# Patient Record
Sex: MALE | Race: WHITE | NOT HISPANIC OR LATINO | URBAN - NONMETROPOLITAN AREA
[De-identification: names, ages, dates, MRNs, and addresses within clinical notes are randomized per-mention and may not be internally consistent; named-entity substitution may affect disease eponyms.]

---

## 2023-05-26 ENCOUNTER — OFFICE (OUTPATIENT)
Dept: URBAN - NONMETROPOLITAN AREA CLINIC 1 | Facility: CLINIC | Age: 23
End: 2023-05-26

## 2023-05-26 VITALS
SYSTOLIC BLOOD PRESSURE: 143 MMHG | DIASTOLIC BLOOD PRESSURE: 88 MMHG | HEART RATE: 60 BPM | WEIGHT: 191 LBS | HEIGHT: 69 IN

## 2023-05-26 DIAGNOSIS — K21.9 GASTRO-ESOPHAGEAL REFLUX DISEASE WITHOUT ESOPHAGITIS: ICD-10-CM

## 2023-05-26 DIAGNOSIS — R10.13 EPIGASTRIC PAIN: ICD-10-CM

## 2023-05-26 PROCEDURE — 36415 COLL VENOUS BLD VENIPUNCTURE: CPT | Performed by: NURSE PRACTITIONER

## 2023-05-26 PROCEDURE — 99213 OFFICE O/P EST LOW 20 MIN: CPT | Performed by: NURSE PRACTITIONER

## 2023-05-26 NOTE — SERVICEHPINOTES
For the past 5 or 6 months, he has had intermittent bouts of epigastric pain. After eating he has pain that starts at his substernal area and radiates through to his back. He has bloating and distention. He has been taking pantoprazole 40 mg daily for the past 5 months, but it does not seem to be helping.

## 2023-05-26 NOTE — SERVICENOTES
The risks for EGD were discussed with him and he agrees to proceed.
Follow-up will depend on his results, at the discretion of the physician.

## 2023-05-27 LAB
C-REACTIVE PROTEIN, QUANT: <1 MG/L
CBC, PLATELET, NO DIFFERENTIAL: HEMATOCRIT: 50.7 % (ref 37.5–51)
CBC, PLATELET, NO DIFFERENTIAL: HEMOGLOBIN: 17.1 G/DL (ref 13–17.7)
CBC, PLATELET, NO DIFFERENTIAL: MCH: 32.2 PG (ref 26.6–33)
CBC, PLATELET, NO DIFFERENTIAL: MCHC: 33.7 G/DL (ref 31.5–35.7)
CBC, PLATELET, NO DIFFERENTIAL: MCV: 96 FL (ref 79–97)
CBC, PLATELET, NO DIFFERENTIAL: NRBC: (no result)
CBC, PLATELET, NO DIFFERENTIAL: PLATELETS: 187 X10E3/UL (ref 150–450)
CBC, PLATELET, NO DIFFERENTIAL: RBC: 5.31 X10E6/UL (ref 4.14–5.8)
CBC, PLATELET, NO DIFFERENTIAL: RDW: 12.2 % (ref 11.6–15.4)
CBC, PLATELET, NO DIFFERENTIAL: WBC: 6.3 X10E3/UL (ref 3.4–10.8)
COMP. METABOLIC PANEL (14): A/G RATIO: 2.2 (ref 1.2–2.2)
COMP. METABOLIC PANEL (14): ALBUMIN: 5 G/DL (ref 4.1–5.2)
COMP. METABOLIC PANEL (14): ALKALINE PHOSPHATASE: 72 IU/L (ref 44–121)
COMP. METABOLIC PANEL (14): ALT (SGPT): 17 IU/L (ref 0–44)
COMP. METABOLIC PANEL (14): AST (SGOT): 18 IU/L (ref 0–40)
COMP. METABOLIC PANEL (14): BILIRUBIN, TOTAL: 0.8 MG/DL (ref 0–1.2)
COMP. METABOLIC PANEL (14): BUN/CREATININE RATIO: 12 (ref 9–20)
COMP. METABOLIC PANEL (14): BUN: 14 MG/DL (ref 6–20)
COMP. METABOLIC PANEL (14): CALCIUM: 9.5 MG/DL (ref 8.7–10.2)
COMP. METABOLIC PANEL (14): CARBON DIOXIDE, TOTAL: 23 MMOL/L (ref 20–29)
COMP. METABOLIC PANEL (14): CHLORIDE: 102 MMOL/L (ref 96–106)
COMP. METABOLIC PANEL (14): CREATININE: 1.14 MG/DL (ref 0.76–1.27)
COMP. METABOLIC PANEL (14): EGFR: 93 ML/MIN/1.73 (ref 59–?)
COMP. METABOLIC PANEL (14): GLOBULIN, TOTAL: 2.3 G/DL (ref 1.5–4.5)
COMP. METABOLIC PANEL (14): GLUCOSE: 90 MG/DL (ref 70–99)
COMP. METABOLIC PANEL (14): POTASSIUM: 4.6 MMOL/L (ref 3.5–5.2)
COMP. METABOLIC PANEL (14): PROTEIN, TOTAL: 7.3 G/DL (ref 6–8.5)
COMP. METABOLIC PANEL (14): SODIUM: 140 MMOL/L (ref 134–144)
LIPASE: 14 U/L (ref 13–78)

## 2023-07-07 ENCOUNTER — ON CAMPUS - OUTPATIENT (OUTPATIENT)
Dept: URBAN - NONMETROPOLITAN AREA HOSPITAL 34 | Facility: HOSPITAL | Age: 23
End: 2023-07-07

## 2023-07-07 DIAGNOSIS — K31.89 OTHER DISEASES OF STOMACH AND DUODENUM: ICD-10-CM

## 2023-07-07 PROCEDURE — 43239 EGD BIOPSY SINGLE/MULTIPLE: CPT | Performed by: INTERNAL MEDICINE

## 2023-11-07 ENCOUNTER — OFFICE (OUTPATIENT)
Dept: URBAN - NONMETROPOLITAN AREA CLINIC 1 | Facility: CLINIC | Age: 23
End: 2023-11-07

## 2023-11-07 VITALS
SYSTOLIC BLOOD PRESSURE: 114 MMHG | DIASTOLIC BLOOD PRESSURE: 79 MMHG | HEIGHT: 69 IN | WEIGHT: 193 LBS | HEART RATE: 61 BPM

## 2023-11-07 DIAGNOSIS — K20.0 EOSINOPHILIC ESOPHAGITIS: ICD-10-CM

## 2023-11-07 DIAGNOSIS — K21.00 GASTRO-ESOPHAGEAL REFLUX DISEASE WITH ESOPHAGITIS, WITHOUT B: ICD-10-CM

## 2023-11-07 PROCEDURE — 99213 OFFICE O/P EST LOW 20 MIN: CPT | Performed by: NURSE PRACTITIONER

## 2023-11-07 NOTE — SERVICEHPINOTES
He comes in for follow-up of epigastric discomfort.  I saw him in May when he was having trouble with intermittent bouts of epigastric pain, especially after eating.  The pain was substernal, and radiating through to his back.  He had associated bloating and distention.  Pantoprazole 40 mg was not helping at the time.   His subsequent EGD shows changes consistent with reflux esophagitis vs EOE.  On path he had esophageal intraepithelial eosinophils up to 20 per [facility].br
[facility] 07/07/2023 by Dr. Grewal-
br Findings:brEsophagus:  Linear furrowing was noted in the distal 3rd of the esophagus consistent with possible reflux related changes versus eosinophilic esophagitis. The proximal esophagus appeared completely normal. Multiple cold forceps biopsies were taken from the distal and proximal esophagus and placed in jars two and three, respectively.brGE Junction:  Normal. No hiatal hernia.brStomach:  Normal with no ulcer, mass, or erosion. Multiple random cold forceps biopsies were taken from the antrum and body of the stomach and placed in jar one.brPylorus:  Normal and patent.brDuodenum:  Normal with no ulcer, duodenitis, mass, AVM, or stricture.
br
br Final Pathologic Diagnosis:br   1.   ENDOSCOPIC BIOPSY "GASTRIC":br     - Benign gastric mucosa with mild features of reactive gastropathy. br     - No evidence of intestinal metaplasia/dysplasia. br     - Negative for Helicobacter pylori-like organisms (immunostain, H. pylori). br   2.   ENDOSCOPIC BIOPSY "DISTAL ESOPHAGUS":br     - Benign mildly hyperplastic esophageal squamous mucosa with intraepithelial eosinophils (up to 20 per [facility]), differential diagnosis includes reflux esophagitis versus eosinophilic esophagitis. Recommend clinical and endoscopic correlation.br     - No evidence of Hackett's esophagus (specialized type). br   3.   ENDOSCOPIC BIOPSY "PROXIMAL ESOPHAGUS":br     - Benign mildly hyperplastic esophageal squamous mucosa with few scattered intraepithelial inflammatory cells including rare eosinophils consistent with mild reflux esophagitis. br     - no evidence of Hackett's esophagus
br
br 
TODAY we discussed these results.  We talked about reflux esophagitis versus EOE.  He says he feels a whole lot better compared to our 1st visit in May.  He stopped taking the pantoprazole and wants to take a wait-and-see approach.   I will see him back in a year, or before that if his symptoms worsen. Dupixent could be considered.br

## 2023-11-07 NOTE — SERVICENOTES
Daily PPI is advised.  He prefers to wait and see if his symptoms recur.
Dupixent can be considered.